# Patient Record
Sex: FEMALE | Race: WHITE | Employment: OTHER | URBAN - METROPOLITAN AREA
[De-identification: names, ages, dates, MRNs, and addresses within clinical notes are randomized per-mention and may not be internally consistent; named-entity substitution may affect disease eponyms.]

---

## 2018-02-11 ENCOUNTER — APPOINTMENT (OUTPATIENT)
Dept: GENERAL RADIOLOGY | Facility: CLINIC | Age: 83
End: 2018-02-11
Attending: EMERGENCY MEDICINE
Payer: MEDICARE

## 2018-02-11 ENCOUNTER — HOSPITAL ENCOUNTER (EMERGENCY)
Facility: CLINIC | Age: 83
Discharge: HOME | End: 2018-02-11
Attending: EMERGENCY MEDICINE | Admitting: EMERGENCY MEDICINE
Payer: MEDICARE

## 2018-02-11 VITALS
RESPIRATION RATE: 12 BRPM | BODY MASS INDEX: 19.63 KG/M2 | HEART RATE: 94 BPM | HEIGHT: 60 IN | TEMPERATURE: 97.8 F | WEIGHT: 100 LBS | OXYGEN SATURATION: 97 % | DIASTOLIC BLOOD PRESSURE: 100 MMHG | SYSTOLIC BLOOD PRESSURE: 137 MMHG

## 2018-02-11 DIAGNOSIS — S62.102A FRACTURE OF WRIST, LEFT, CLOSED, INITIAL ENCOUNTER: ICD-10-CM

## 2018-02-11 LAB
ANION GAP SERPL CALCULATED.3IONS-SCNC: 4 MMOL/L (ref 3–14)
BASOPHILS # BLD AUTO: 0 10E9/L (ref 0–0.2)
BASOPHILS NFR BLD AUTO: 0.2 %
BUN SERPL-MCNC: 18 MG/DL (ref 7–30)
CALCIUM SERPL-MCNC: 8.7 MG/DL (ref 8.5–10.1)
CHLORIDE SERPL-SCNC: 111 MMOL/L (ref 94–109)
CO2 SERPL-SCNC: 29 MMOL/L (ref 20–32)
CREAT SERPL-MCNC: 0.67 MG/DL (ref 0.52–1.04)
DIFFERENTIAL METHOD BLD: ABNORMAL
EOSINOPHIL # BLD AUTO: 0 10E9/L (ref 0–0.7)
EOSINOPHIL NFR BLD AUTO: 0.3 %
ERYTHROCYTE [DISTWIDTH] IN BLOOD BY AUTOMATED COUNT: 16.7 % (ref 10–15)
GFR SERPL CREATININE-BSD FRML MDRD: 82 ML/MIN/1.7M2
GLUCOSE SERPL-MCNC: 100 MG/DL (ref 70–99)
HCT VFR BLD AUTO: 35.4 % (ref 35–47)
HGB BLD-MCNC: 11.8 G/DL (ref 11.7–15.7)
IMM GRANULOCYTES # BLD: 0 10E9/L (ref 0–0.4)
IMM GRANULOCYTES NFR BLD: 0.3 %
INTERPRETATION ECG - MUSE: NORMAL
LYMPHOCYTES # BLD AUTO: 0.6 10E9/L (ref 0.8–5.3)
LYMPHOCYTES NFR BLD AUTO: 9.8 %
MCH RBC QN AUTO: 31.8 PG (ref 26.5–33)
MCHC RBC AUTO-ENTMCNC: 33.3 G/DL (ref 31.5–36.5)
MCV RBC AUTO: 95 FL (ref 78–100)
MONOCYTES # BLD AUTO: 0.4 10E9/L (ref 0–1.3)
MONOCYTES NFR BLD AUTO: 6 %
NEUTROPHILS # BLD AUTO: 5.4 10E9/L (ref 1.6–8.3)
NEUTROPHILS NFR BLD AUTO: 83.4 %
NRBC # BLD AUTO: 0 10*3/UL
NRBC BLD AUTO-RTO: 0 /100
PLATELET # BLD AUTO: 162 10E9/L (ref 150–450)
POTASSIUM SERPL-SCNC: 3.6 MMOL/L (ref 3.4–5.3)
RBC # BLD AUTO: 3.71 10E12/L (ref 3.8–5.2)
SODIUM SERPL-SCNC: 144 MMOL/L (ref 133–144)
WBC # BLD AUTO: 6.5 10E9/L (ref 4–11)

## 2018-02-11 PROCEDURE — 25000132 ZZH RX MED GY IP 250 OP 250 PS 637: Mod: GY | Performed by: EMERGENCY MEDICINE

## 2018-02-11 PROCEDURE — 25000128 H RX IP 250 OP 636: Performed by: EMERGENCY MEDICINE

## 2018-02-11 PROCEDURE — 93005 ELECTROCARDIOGRAM TRACING: CPT

## 2018-02-11 PROCEDURE — 80048 BASIC METABOLIC PNL TOTAL CA: CPT | Performed by: EMERGENCY MEDICINE

## 2018-02-11 PROCEDURE — A9270 NON-COVERED ITEM OR SERVICE: HCPCS | Mod: GY | Performed by: EMERGENCY MEDICINE

## 2018-02-11 PROCEDURE — 40000986 XR WRIST LT  2 VW: Mod: LT

## 2018-02-11 PROCEDURE — 99152 MOD SED SAME PHYS/QHP 5/>YRS: CPT

## 2018-02-11 PROCEDURE — 99291 CRITICAL CARE FIRST HOUR: CPT | Mod: 25

## 2018-02-11 PROCEDURE — 73090 X-RAY EXAM OF FOREARM: CPT | Mod: LT

## 2018-02-11 PROCEDURE — 85025 COMPLETE CBC W/AUTO DIFF WBC: CPT | Performed by: EMERGENCY MEDICINE

## 2018-02-11 PROCEDURE — 25605 CLTX DST RDL FX/EPHYS SEP W/: CPT | Mod: LT

## 2018-02-11 RX ORDER — PROPOFOL 10 MG/ML
60 INJECTION, EMULSION INTRAVENOUS ONCE
Status: COMPLETED | OUTPATIENT
Start: 2018-02-11 | End: 2018-02-11

## 2018-02-11 RX ORDER — ACETAMINOPHEN 325 MG/1
650 TABLET ORAL ONCE
Status: COMPLETED | OUTPATIENT
Start: 2018-02-11 | End: 2018-02-11

## 2018-02-11 RX ORDER — TRAMADOL HYDROCHLORIDE 50 MG/1
50 TABLET ORAL EVERY 4 HOURS PRN
Qty: 20 TABLET | Refills: 0 | Status: SHIPPED | OUTPATIENT
Start: 2018-02-11

## 2018-02-11 RX ORDER — PROPOFOL 10 MG/ML
INJECTION, EMULSION INTRAVENOUS
Status: DISCONTINUED
Start: 2018-02-11 | End: 2018-02-11 | Stop reason: HOSPADM

## 2018-02-11 RX ADMIN — PROPOFOL 60 MG: 10 INJECTION, EMULSION INTRAVENOUS at 14:43

## 2018-02-11 RX ADMIN — ACETAMINOPHEN 650 MG: 325 TABLET, FILM COATED ORAL at 15:39

## 2018-02-11 ASSESSMENT — ENCOUNTER SYMPTOMS
DYSURIA: 0
NECK PAIN: 0
HEADACHES: 0
BACK PAIN: 0

## 2018-02-11 NOTE — ED AVS SNAPSHOT
Emergency Department    [address] [facility] [address]    Phone:  [phone]    Fax:  [phone]                                       Elana Renae   MRN: [id number]    Department:   Emergency Department   Date of Visit:  2/11/2018           Patient Information     Date Of Birth          1/12/1927        Your diagnoses for this visit were:     Fracture of wrist, left, closed, initial encounter        You were seen by Jennie Vivas MD.      Follow-up Information     Follow up with yLnne Coon MD.    Specialty:  Orthopedics    Why:  this week for follow-up and cast placement.    Contact information:    [facility]  [address] [place] [id number] [phone]          Follow up with  Emergency Department.    Specialty:  EMERGENCY MEDICINE    Why:  As needed for problems    Contact information:    [address] [facility] [address] [phone]        Discharge Instructions       Take Tylenol 650 mg every 4 hours as needed for pain.    Ice/elevated wrist for the next 2 days.    No driving a car until you talk to the Orthopedic Surgeon and get your cast.    Opioid Medication Information    You have been given a prescription for an opioid (narcotic) pain medicine and/or have received a pain medicine while here in the Emergency Department. These medicines can make you drowsy or impaired. You must not drive, operate dangerous equipment, or engage in any other dangerous activities while taking these medications. If you drive while taking these medications, you could be arrested for DUI, or driving under the influence. Do not drink any alcohol while you are taking these medications.   Opioid pain medications can cause addiction. If you have a history of chemical dependency of any type, you are at a higher risk of becoming addicted to pain medications.  Only take these prescribed medications to treat your pain when all other options  have been tried. Take it for as short a time and as few doses as possible. Store your pain pills in a secure place, as they are frequently stolen and provide a dangerous opportunity for children or visitors in your house to start abusing these powerful medications. We will not replace any lost or stolen medicine.  As soon as your pain is better, you should flush all your remaining medication.   Many prescription pain medications contain Tylenol  (acetaminophen), including Vicodin , Tylenol #3 , Norco , Lortab , and Percocet .  You should not take any extra pills of Tylenol  if you are using these prescription medications or you can get very sick.  Do not ever take more than 4000 mg of acetaminophen in any 24 hour period.  All opioids tend to cause constipation. Drink plenty of water and eat foods that have a lot of fiber, such as fruits, vegetables, prune juice, apple juice and high fiber cereal.  Take a laxative if you don t move your bowels at least every other day. Miralax , Milk of Magnesia, Colace , or Senna  can be used to keep you regular.            Discharge References/Attachments     WRIST FRACTURES, TREATING (ENGLISH)      24 Hour Appointment Hotline       To make an appointment at any [facility] clinic, call [phone] ([phone]). If you don't have a family doctor or clinic, we will help you find one. [facility] clinics are conveniently located to serve the needs of you and your family.             Review of your medicines      START taking        Dose / Directions Last dose taken    traMADol 50 MG tablet   Commonly known as:  ULTRAM   Dose:  50 mg   Quantity:  20 tablet   Replaces:  traMADol 100 MG 24 hr tablet        Take 1 tablet (50 mg) by mouth every 4 hours as needed for moderate pain No driving a car or drinking alcohol for 8 hours after taking this medication.   Refills:  0          Our records show that you are taking the medicines listed below. If these are incorrect, please call your  family doctor or clinic.        Dose / Directions Last dose taken    acetaminophen 500 MG tablet   Commonly known as:  TYLENOL   Dose:  1000 mg        Take 2 tablets (1,000 mg) by mouth 3 times daily   Refills:  0        ACIDOPHILUS PO   Dose:  1 tablet        Take 1 tablet by mouth 3 times daily   Refills:  0        AMOXICILLIN PO   Dose:  1000 mg        Take 1,000 mg by mouth 2 times daily X 14 days for H. Pylori   Refills:  0        ASPIRIN PO   Dose:  325 mg        Take 325 mg by mouth daily   Refills:  0        CLARITHROMYCIN PO   Dose:  500 mg        Take 500 mg by mouth 2 times daily X 14 days for H. Pylori   Refills:  0        diltiazem 180 MG 24 hr ER beaded capsule   Commonly known as:  TIAZAC   Dose:  180 mg        Take 180 mg by mouth daily   Refills:  0        metoprolol tartrate 50 MG tablet   Commonly known as:  LOPRESSOR   Dose:  150 mg   Quantity:  90 tablet        Take 3 tablets (150 mg) by mouth 2 times daily   Refills:  0        MULTIVITAMIN PO   Dose:  1 tablet        Take 1 tablet by mouth daily   Refills:  0        OMEGA-3 FISH OIL PO   Dose:  1 g        Take 1 g by mouth 3 times daily (with meals)   Refills:  0        OMEPRAZOLE PO   Dose:  20 mg        Take 20 mg by mouth 2 times daily (before meals)   Refills:  0        VITAMIN D (CHOLECALCIFEROL) PO   Dose:  800 Units        Take 800 Units by mouth daily   Refills:  0          STOP taking        Dose Reason for stopping Comments    traMADol 100 MG 24 hr tablet   Commonly known as:  ULTRAM-ER   Replaced by:  traMADol 50 MG tablet                      Prescriptions were sent or printed at these locations (1 Prescription)                   Other Prescriptions                Printed at Department/Unit printer (1 of 1)         traMADol (ULTRAM) 50 MG tablet                Procedures and tests performed during your visit     Basic metabolic panel    CBC with platelets + differential    EKG 12 lead    XR Forearm Left 2 Views    XR Wrist Left 2  Views      Orders Needing Specimen Collection     None      Pending Results     No orders found from 2/9/2018 to 2/12/2018.            Pending Culture Results     No orders found from 2/9/2018 to 2/12/2018.            Pending Results Instructions     If you had any lab results that were not finalized at the time of your Discharge, you can call the ED Lab Result RN at [phone]. You will be contacted by this team for any positive Lab results or changes in treatment. The nurses are available 7 days a week from 10A to 6:30P.  You can leave a message 24 hours per day and they will return your call.        Test Results From Your Hospital Stay        2/11/2018  1:32 PM      Component Results     Component Value Ref Range & Units Status    WBC 6.5 4.0 - 11.0 10e9/L Final    RBC Count 3.71 (L) 3.8 - 5.2 10e12/L Final    Hemoglobin 11.8 11.7 - 15.7 g/dL Final    Hematocrit 35.4 35.0 - 47.0 % Final    MCV 95 78 - 100 fl Final    MCH 31.8 26.5 - 33.0 pg Final    MCHC 33.3 31.5 - 36.5 g/dL Final    RDW 16.7 (H) 10.0 - 15.0 % Final    Platelet Count 162 150 - 450 10e9/L Final    Diff Method Automated Method  Final    % Neutrophils 83.4 % Final    % Lymphocytes 9.8 % Final    % Monocytes 6.0 % Final    % Eosinophils 0.3 % Final    % Basophils 0.2 % Final    % Immature Granulocytes 0.3 % Final    Nucleated RBCs 0 0 /100 Final    Absolute Neutrophil 5.4 1.6 - 8.3 10e9/L Final    Absolute Lymphocytes 0.6 (L) 0.8 - 5.3 10e9/L Final    Absolute Monocytes 0.4 0.0 - 1.3 10e9/L Final    Absolute Eosinophils 0.0 0.0 - 0.7 10e9/L Final    Absolute Basophils 0.0 0.0 - 0.2 10e9/L Final    Abs Immature Granulocytes 0.0 0 - 0.4 10e9/L Final    Absolute Nucleated RBC 0.0  Final         2/11/2018  1:54 PM      Component Results     Component Value Ref Range & Units Status    Sodium 144 133 - 144 mmol/L Final    Potassium 3.6 3.4 - 5.3 mmol/L Final    Chloride 111 (H) 94 - 109 mmol/L Final    Carbon Dioxide 29 20 - 32 mmol/L Final    Anion Gap  4 3 - 14 mmol/L Final    Glucose 100 (H) 70 - 99 mg/dL Final    Urea Nitrogen 18 7 - 30 mg/dL Final    Creatinine 0.67 0.52 - 1.04 mg/dL Final    GFR Estimate 82 >60 mL/min/1.7m2 Final    Non  GFR Calc    GFR Estimate If Black >90 >60 mL/min/1.7m2 Final    African American GFR Calc    Calcium 8.7 8.5 - 10.1 mg/dL Final                     2/11/2018  1:32 PM      Narrative     LEFT FOREARM TWO VIEWS 2/11/2018 1:10 PM     HISTORY: Deformity wrist after fall, check for fracture.     COMPARISON: None.        Impression     IMPRESSION: Posteriorly displaced fractures of the distal radius and  ulna. No other fracture demonstrated.    JAMILAH GAGNON MD         2/11/2018  3:33 PM      Narrative     WRIST LEFT TWO VIEWS 2/11/2018 3:16 PM     HISTORY: Post-reduction.     COMPARISON: Earlier same day.    FINDINGS: Overlying splint obscures detail. The fracture  demonstrates  improved alignment.        Impression     IMPRESSION: Wrist fracture in improved alignment.     JAMILAH GAGNON MD                Clinical Quality Measure: Blood Pressure Screening     Your blood pressure was checked while you were in the emergency department today. The last reading we obtained was  BP: (!) 137/100 . Please read the guidelines below about what these numbers mean and what you should do about them.  If your systolic blood pressure (the top number) is less than 120 and your diastolic blood pressure (the bottom number) is less than 80, then your blood pressure is normal. There is nothing more that you need to do about it.  If your systolic blood pressure (the top number) is 120-139 or your diastolic blood pressure (the bottom number) is 80-89, your blood pressure may be higher than it should be. You should have your blood pressure rechecked within a year by a primary care provider.  If your systolic blood pressure (the top number) is 140 or greater or your diastolic blood pressure (the bottom number) is 90 or greater, you may have  "high blood pressure. High blood pressure is treatable, but if left untreated over time it can put you at risk for heart attack, stroke, or kidney failure. You should have your blood pressure rechecked by a primary care provider within the next 4 weeks.  If your provider in the emergency department today gave you specific instructions to follow-up with your doctor or provider even sooner than that, you should follow that instruction and not wait for up to 4 weeks for your follow-up visit.        Thank you for choosing [facility]       Thank you for choosing [facility] for your care. Our goal is always to provide you with excellent care. Hearing back from our patients is one way we can continue to improve our services. Please take a few minutes to complete the written survey that you may receive in the mail after you visit with us. Thank you!        [facility] Information     Info lets you send messages to your doctor, view your test results, renew your prescriptions, schedule appointments and more. To sign up, go to [email or web address] . Click on \"Log in\" on the left side of the screen, which will take you to the Welcome page. Then click on \"Sign up Now\" on the right side of the page.     You will be asked to enter the access code listed below, as well as some personal information. Please follow the directions to create your username and password.     Your access code is: [email or web address]  Expires: 2018  4:20 PM     Your access code will  in 90 days. If you need help or a new code, please call your [facility] clinic or [phone].        Care EveryWhere ID     This is your Care EveryWhere ID. This could be used by other organizations to access your [facility] medical records  [id number]        Equal Access to Services     AVIVA ROTH : emperatriz Chilel qaybta kaalmada adeegyada, waxay idiin hayaan adeeg kharash la'aan ah. So [facility] [phone].    ATENCIÓN: Si leonorala espmarvin bruce " a chopra disposición servicios gratuitos de asistencia lingüística. Llcally al [phone].    We comply with applicable federal civil rights laws and [place] laws. We do not discriminate on the basis of race, color, national origin, age, disability, sex, sexual orientation, or gender identity.            After Visit Summary       This is your record. Keep this with you and show to your community pharmacist(s) and doctor(s) at your next visit.

## 2018-02-11 NOTE — ED PROVIDER NOTES
History     Chief Complaint:  Fall    HPI   Elana Renae is a 89 year old female who presents to the emergency department for evaluation of a fall. The patient states that she was walking through her hallway and turned around and lost balance and fell. She notes that she did not hit her head or lose consciousness. She tried to catch herself with he left hand and hurt her wrist with a notable deformity. She also mentions pain in her elbow. She denies any headache, neck pain, or back pain. She is not on any blood thinners. She lives in a senior apartment. She denies any dysuria chest pain or abdominal pain.     Allergies:  Amiloride  Barbiturates  Meperidin    Medications:    tramadol (ULTRAM-ER) 100 MG ER - tablet  acetaminophen (TYLENOL) 500 MG tablet  metoprolol (LOPRESSOR) 50 MG tablet  diltiazem (TIAZAC) 180 MG 24 hr ER beaded capsule  Lactobacillus (ACIDOPHILUS PO)  AMOXICILLIN PO  CLARITHROMYCIN PO  OMEPRAZOLE PO  ASPIRIN PO  Omega-3 Fatty Acids (OMEGA-3 FISH OIL PO)  VITAMIN D, CHOLECALCIFEROL, PO  Multiple Vitamins-Minerals (MULTIVITAMIN OR)    Past Medical History:    Atrial fibrillation  Cancer  CVA  Kidney stones    Past Surgical History:    C section  GI surgery  Lithotripsy  Wrist surgery    Family History:    History reviewed. No pertinent family history.     Social History:  Marital Status:   [5]  Social History   Substance Use Topics     Smoking status: Never Smoker     Alcohol use No        Review of Systems   Genitourinary: Negative for dysuria.   Musculoskeletal: Negative for back pain and neck pain.   Neurological: Negative for syncope and headaches.   All other systems reviewed and are negative.        Physical Exam     Patient Vitals for the past 24 hrs:   BP Temp Temp src Pulse Heart Rate Resp SpO2 Height Weight   02/11/18 1516 - - - - - - 97 % - -   02/11/18 1511 - - - - - - 96 % - -   02/11/18 1445 (!) 137/100 - - - 96 12 100 % - -   02/11/18 1440 (!) 148/99 - - - 95 20 100 % -  -   02/11/18 1435 (!) 164/102 - - - 96 12 100 % - -   02/11/18 1433 - - - - - 19 95 % - -   02/11/18 1429 (!) 167/116 - - - 101 9 100 % - -   02/11/18 1349 - - - - - - 96 % - -   02/11/18 1348 - - - - - - 96 % - -   02/11/18 1324 - - - - - - 95 % - -   02/11/18 1323 - - - - - - 95 % - -   02/11/18 1312 - - - - - - 95 % - -   02/11/18 1311 - - - - - - 94 % - -   02/11/18 1247 - - - - 91 12 - - -   02/11/18 1230 (!) 151/95 - - - 96 10 - - -   02/11/18 1219 - - - - 92 11 97 % - -   02/11/18 1218 - - - - 92 16 96 % - -   02/11/18 1157 (!) 170/112 97.8  F (36.6  C) Oral 94 - 16 97 % 1.524 m (5') 45.4 kg (100 lb)           Physical Exam  Nursing note and vitals reviewed.  Constitutional:  Oriented to person, place, and time.      Appears well-developed and well-nourished.   HENT:   Head:    Atraumatic.   Mouth/Throat:   Oropharynx is clear and moist. No oropharyngeal exudate.   Eyes:    EOM are normal. Pupils are equal, round, and reactive to light.   Neck:    Normal range of motion without pain. Neck supple. Non tender midline     No tracheal deviation present. No thyromegaly present.   Cardiovascular:  Normal rate, regular rhythm, normal heart sounds and      intact distal pulses.  Exam reveals no gallop and no friction rub.       No murmur heard.  Pulmonary/Chest: Effort normal and breath sounds normal.      No respiratory distress. No wheezes. No rales.      Exhibits no tenderness.   Abdominal:   Soft. Bowel sounds are normal. Exhibits no distension and      no mass. There is no tenderness.      There is no rebound and no guarding.   Musculoskeletal:  Left arm exam: deformity with swelling and ecchymosis to the distal radius and ulna. Mild tenderness to the olecranon of the elbow without deformity. Shoulder and hand are non tender. No breaks to the skin.  Good radial pulse and distal sensation.  Lymphadenopathy:  No cervical adenopathy.   Neurological:   Alert and oriented to person, place, and time. GCS 15.  CN 2-12  intact.  and proximal upper extremity strength strong and equal.  Bilateral lower extremity strength strong and equal, including strong dorsiflexion and plantarflexion strength.  Sensation intact and equal to the face, arms and legs.  No facial droop or weakness. Normal speech.  Follows commands and answers questions normally.    Skin:    Skin is warm and dry. No rash noted. No pallor.       Emergency Department Course     ECG:  ECG taken at 1206, ECG read at 1208  Atrial fibrillation  Abnormal ECG  Rate 97 bpm. MO interval * ms. QRS duration 74 ms. QT/QTc 356/452 ms. P-R-T axes * 2 -21.    Imaging:  Radiology findings were communicated with the patient who voiced understanding of the findings.    XR Forearm Left 2 Views  Final Result  IMPRESSION: Posteriorly displaced fractures of the distal radius and  ulna. No other fracture demonstrated.    JAMILAH GAGNON MD  Reading per radiology    Laboratory:  Laboratory findings were communicated with the patient who voiced understanding of the findings.    CBC: WBC 6.5, HGB 11.8,   BMP: Cl 111 (H), glucose 100 (H), o/w WNL (Creatinine 0.67)    Procedures:      Sedation:      PERFORMED BY: Jennie Vivas MD,    Pre-Procedure Assessment done at 1200.    EXPECTED LEVEL:  Minimal Sedation    INDICATION:  Sedation is required to allow for fracture reduction    Consent obtained from patient after discussing the risks, benefits and alternatives.    PO INTAKE: Appropriately NPO for procedure    ASA CLASS: Class 1 - HEALTHY PATIENT    MALLAMPATI: Grade 2:  Soft palate, base of uvula, tonsillar pillars, and portion of posterior pharyngeal wall visible    LUNGS: Lungs Clear with good breath sounds bilaterally.     HEART: Normal heart sounds and rate    History and physical reviewed and no updates needed. I have reviewed the lab findings, diagnostic data, medications, and the plan for sedation. I have determined this patient to be an appropriate candidate for the  planned sedation and procedure and have reassessed the patient IMMEDIATELY PRIOR to sedation and procedure.    Sedation Post Procedure Summary:    Prior to the start of the procedure and with procedural staff participation, I verbally confirmed the patient s identity using two indicators, relevant allergies, that the procedure was appropriate and matched the consent or emergent situation, and that the correct equipment/implants were available. Immediately prior to starting the procedure I conducted the Time Out with the procedural staff and re-confirmed the patient s name, procedure, and site/side. (The Joint Commission universal protocol was followed.)  Yes      SEDATIVES: Propofol    Vital signs, airway, End Tidal CO2 and pulse oximetry were monitored and remained stable throughout the procedure and sedation was maintained until the procedure was complete.  The patient was monitored by staff until sedation discharge criteria were met.    PATIENT TOLERANCE: Patient tolerated the procedure well with no immediate complications.    TIME OF SEDATION IN MINUTES:  15 minutes from beginning to end of physician one to one monitoring.      Procedure Note: Reduction of Fractured Wrist  Physician: Jennie Vivas MD,  Diagnosis: distal radius and ulna reduction, left  Consent: Informed written, see paper chart  Description of Procedure: Consent as above.  Patient positioned in supine position.  Procedural anesthesia was utilized, see above note.  The arm was grasped above and below fracture.  Recreating mechanism of injury the fracture area was reduced successfully utilizing traction and manipulation motions.   The neurovascular exam was normal before and after reduction attempt.  The patient tolerated the procedure well, there were no complications.      Procedure Note: Splint Placement  Physician: Jennie Vivas MD,  Diagnosis: distal radius and ulna reduction, left  Consent: Informed written, see paper  chart  Description of Procedure:  Following reduction of wrist fracture a short arm sugartong splint was applied (orthoglass) to the MCP's.  The elbow was maintained at 90 degrees flexion.  The neurovascular exam was normal before and after splint placement.  The patient tolerated the procedure well, there were no complications. A sling was applied.     Procedure Note: Hematoma block:  PROCEDURE: Hematoma block right distal radius fracture    INDICATION:  Fracture distal radius    PHYSICIAN:  Jennie Vivas MD,  DESCRIPTION OF PROCEDURE:    Informed consent. Site was correctly identified. Anesthesia was obtained with 1 cc of 1% lidocaine without epinephrine and 1 CC of Marcaine, local infiltration.  Using a 18 gauge needle in standard fashion the distal radius fracture site was entered and 8cc of lidocaine introduced.   Band-Aid over site after.  Patient tolerated the procedure well, no complications.      Interventions:  1443 Propofol 60 mg IV    Emergency Department Course:  Nursing notes and vitals reviewed.  I performed an exam of the patient as documented above.   I discussed the treatment plan with the patient. They expressed understanding of this plan and consented to discharge. They will be discharged home with instructions for care and follow up. In addition, the patient will return to the emergency department if their symptoms persist, worsen, if new symptoms arise or if there is any concern.  All questions were answered.    I personally reviewed the imaging and lab results with the patient and answered all related questions prior to discharge.  Impression & Plan      Medical Decision Making:  I found the patient to have a closed left distal radius and ulnar fractures which I reduced under IV procedural sedation with good reduction and splint was placed. She does not have any other injuries and states she does not think she hit her head and she has no headache or neck pain. I discussed with the  patient admitting her for observation vs if she cannot manage at home but she strongly wishes to be discharged home so she was discharged with her son. She was told not to drive her car until further discussion with orthopedic surgery and to follow up this week. Her son states that he will give her assistance at home and I ambulated her while here.     Diagnosis:    ICD-10-CM    1. Fracture of wrist, left, closed, initial encounter S62.102A      Disposition:   Discharged    Discharge Medications:  New Prescriptions    TRAMADOL (ULTRAM) 50 MG TABLET    Take 1 tablet (50 mg) by mouth every 4 hours as needed for moderate pain No driving a car or drinking alcohol for 8 hours after taking this medication.       Scribe Disclosure:  I, Asaf Crane, am serving as a scribe at 12:10 PM on 2/11/2018 to document services personally performed by Jennie Vivas MD, based on my observations and the provider's statements to me.        EMERGENCY DEPARTMENT       Jennie Vivas MD  02/11/18 1077

## 2018-02-11 NOTE — DISCHARGE INSTRUCTIONS
Take Tylenol 650 mg every 4 hours as needed for pain.    Ice/elevated wrist for the next 2 days.    No driving a car until you talk to the Orthopedic Surgeon and get your cast.    Opioid Medication Information    You have been given a prescription for an opioid (narcotic) pain medicine and/or have received a pain medicine while here in the Emergency Department. These medicines can make you drowsy or impaired. You must not drive, operate dangerous equipment, or engage in any other dangerous activities while taking these medications. If you drive while taking these medications, you could be arrested for DUI, or driving under the influence. Do not drink any alcohol while you are taking these medications.   Opioid pain medications can cause addiction. If you have a history of chemical dependency of any type, you are at a higher risk of becoming addicted to pain medications.  Only take these prescribed medications to treat your pain when all other options have been tried. Take it for as short a time and as few doses as possible. Store your pain pills in a secure place, as they are frequently stolen and provide a dangerous opportunity for children or visitors in your house to start abusing these powerful medications. We will not replace any lost or stolen medicine.  As soon as your pain is better, you should flush all your remaining medication.   Many prescription pain medications contain Tylenol  (acetaminophen), including Vicodin , Tylenol #3 , Norco , Lortab , and Percocet .  You should not take any extra pills of Tylenol  if you are using these prescription medications or you can get very sick.  Do not ever take more than 4000 mg of acetaminophen in any 24 hour period.  All opioids tend to cause constipation. Drink plenty of water and eat foods that have a lot of fiber, such as fruits, vegetables, prune juice, apple juice and high fiber cereal.  Take a laxative if you don t move your bowels at least every other day.  Miralax , Milk of Magnesia, Colace , or Senna  can be used to keep you regular.

## 2018-02-11 NOTE — ED NOTES
Bed: ED11  Expected date: 2/11/18  Expected time: 11:39 AM  Means of arrival: Ambulance  Comments:  512 89F wrist fx  ETA 1142

## 2018-02-11 NOTE — ED AVS SNAPSHOT
Emergency Department    [address] [place] [id number]    Phone:  [phone]    Fax:  [phone]                                       Elana Renae   MRN: [id number]    Department:   Emergency Department   Date of Visit:  2/11/2018           After Visit Summary Signature Page     I have received my discharge instructions, and my questions have been answered. I have discussed any challenges I see with this plan with the nurse or doctor.    ..........................................................................................................................................  Patient/Patient Representative Signature      ..........................................................................................................................................  Patient Representative Print Name and Relationship to Patient    ..................................................               ................................................  Date                                            Time    ..........................................................................................................................................  Reviewed by Signature/Title    ...................................................              ..............................................  Date                                                            Time

## 2018-02-11 NOTE — ED NOTES
Bed: ED11  Expected date: 2/11/18  Expected time: 2:26 PM  Means of arrival:   Comments:  Pt in STAB3

## 2018-02-26 ENCOUNTER — ANESTHESIA (OUTPATIENT)
Dept: SURGERY | Facility: CLINIC | Age: 83
End: 2018-02-26
Payer: MEDICARE

## 2018-02-26 ENCOUNTER — SURGERY (OUTPATIENT)
Age: 83
End: 2018-02-26

## 2018-02-26 ENCOUNTER — APPOINTMENT (OUTPATIENT)
Dept: GENERAL RADIOLOGY | Facility: CLINIC | Age: 83
End: 2018-02-26
Attending: ORTHOPAEDIC SURGERY
Payer: MEDICARE

## 2018-02-26 ENCOUNTER — ANESTHESIA EVENT (OUTPATIENT)
Dept: SURGERY | Facility: CLINIC | Age: 83
End: 2018-02-26
Payer: MEDICARE

## 2018-02-26 ENCOUNTER — HOSPITAL ENCOUNTER (OUTPATIENT)
Facility: CLINIC | Age: 83
Discharge: HOME | End: 2018-02-26
Attending: ORTHOPAEDIC SURGERY | Admitting: ORTHOPAEDIC SURGERY
Payer: MEDICARE

## 2018-02-26 VITALS
RESPIRATION RATE: 12 BRPM | OXYGEN SATURATION: 98 % | TEMPERATURE: 98.2 F | BODY MASS INDEX: 20.62 KG/M2 | DIASTOLIC BLOOD PRESSURE: 76 MMHG | HEIGHT: 60 IN | SYSTOLIC BLOOD PRESSURE: 120 MMHG | WEIGHT: 105 LBS

## 2018-02-26 DIAGNOSIS — S52.602A CLOSED FRACTURE OF DISTAL ENDS OF LEFT RADIUS AND ULNA, INITIAL ENCOUNTER: Primary | ICD-10-CM

## 2018-02-26 DIAGNOSIS — S52.502A CLOSED FRACTURE OF DISTAL ENDS OF LEFT RADIUS AND ULNA, INITIAL ENCOUNTER: Primary | ICD-10-CM

## 2018-02-26 PROCEDURE — 25000128 H RX IP 250 OP 636: Performed by: ANESTHESIOLOGY

## 2018-02-26 PROCEDURE — C1713 ANCHOR/SCREW BN/BN,TIS/BN: HCPCS | Performed by: ORTHOPAEDIC SURGERY

## 2018-02-26 PROCEDURE — 27211024 ZZHC OR SUPPLY OTHER OPNP: Performed by: ORTHOPAEDIC SURGERY

## 2018-02-26 PROCEDURE — 40000170 ZZH STATISTIC PRE-PROCEDURE ASSESSMENT II: Performed by: ORTHOPAEDIC SURGERY

## 2018-02-26 PROCEDURE — 27210794 ZZH OR GENERAL SUPPLY STERILE: Performed by: ORTHOPAEDIC SURGERY

## 2018-02-26 PROCEDURE — 71000027 ZZH RECOVERY PHASE 2 EACH 15 MINS: Performed by: ORTHOPAEDIC SURGERY

## 2018-02-26 PROCEDURE — 37000008 ZZH ANESTHESIA TECHNICAL FEE, 1ST 30 MIN: Performed by: ORTHOPAEDIC SURGERY

## 2018-02-26 PROCEDURE — 25000128 H RX IP 250 OP 636: Performed by: NURSE ANESTHETIST, CERTIFIED REGISTERED

## 2018-02-26 PROCEDURE — 25000128 H RX IP 250 OP 636: Performed by: PHYSICIAN ASSISTANT

## 2018-02-26 PROCEDURE — 71000012 ZZH RECOVERY PHASE 1 LEVEL 1 FIRST HR: Performed by: ORTHOPAEDIC SURGERY

## 2018-02-26 PROCEDURE — 36000058 ZZH SURGERY LEVEL 3 EA 15 ADDTL MIN: Performed by: ORTHOPAEDIC SURGERY

## 2018-02-26 PROCEDURE — 37000009 ZZH ANESTHESIA TECHNICAL FEE, EACH ADDTL 15 MIN: Performed by: ORTHOPAEDIC SURGERY

## 2018-02-26 PROCEDURE — 71000013 ZZH RECOVERY PHASE 1 LEVEL 1 EA ADDTL HR: Performed by: ORTHOPAEDIC SURGERY

## 2018-02-26 PROCEDURE — 40000277 XR SURGERY CARM FLUORO LESS THAN 5 MIN W STILLS

## 2018-02-26 PROCEDURE — 25000125 ZZHC RX 250: Performed by: NURSE ANESTHETIST, CERTIFIED REGISTERED

## 2018-02-26 PROCEDURE — 25000125 ZZHC RX 250: Performed by: ORTHOPAEDIC SURGERY

## 2018-02-26 PROCEDURE — 27210995 ZZH RX 272: Performed by: ORTHOPAEDIC SURGERY

## 2018-02-26 PROCEDURE — 36000060 ZZH SURGERY LEVEL 3 W FLUORO 1ST 30 MIN: Performed by: ORTHOPAEDIC SURGERY

## 2018-02-26 DEVICE — IMP WIRE KIRSCHNER 0.045X4" 78.2020: Type: IMPLANTABLE DEVICE | Site: WRIST | Status: FUNCTIONAL

## 2018-02-26 RX ORDER — NALOXONE HYDROCHLORIDE 0.4 MG/ML
.1-.4 INJECTION, SOLUTION INTRAMUSCULAR; INTRAVENOUS; SUBCUTANEOUS
Status: DISCONTINUED | OUTPATIENT
Start: 2018-02-26 | End: 2018-02-26 | Stop reason: HOSPADM

## 2018-02-26 RX ORDER — ONDANSETRON 4 MG/1
4 TABLET, ORALLY DISINTEGRATING ORAL EVERY 30 MIN PRN
Status: DISCONTINUED | OUTPATIENT
Start: 2018-02-26 | End: 2018-02-26 | Stop reason: HOSPADM

## 2018-02-26 RX ORDER — HYDRALAZINE HYDROCHLORIDE 20 MG/ML
2.5-5 INJECTION INTRAMUSCULAR; INTRAVENOUS EVERY 10 MIN PRN
Status: DISCONTINUED | OUTPATIENT
Start: 2018-02-26 | End: 2018-02-26 | Stop reason: HOSPADM

## 2018-02-26 RX ORDER — CEFAZOLIN SODIUM 1 G/3ML
INJECTION, POWDER, FOR SOLUTION INTRAMUSCULAR; INTRAVENOUS PRN
Status: DISCONTINUED | OUTPATIENT
Start: 2018-02-26 | End: 2018-02-26

## 2018-02-26 RX ORDER — FENTANYL CITRATE 50 UG/ML
25-50 INJECTION, SOLUTION INTRAMUSCULAR; INTRAVENOUS
Status: COMPLETED | OUTPATIENT
Start: 2018-02-26 | End: 2018-02-26

## 2018-02-26 RX ORDER — ONDANSETRON 2 MG/ML
INJECTION INTRAMUSCULAR; INTRAVENOUS PRN
Status: DISCONTINUED | OUTPATIENT
Start: 2018-02-26 | End: 2018-02-26

## 2018-02-26 RX ORDER — PROPOFOL 10 MG/ML
INJECTION, EMULSION INTRAVENOUS CONTINUOUS PRN
Status: DISCONTINUED | OUTPATIENT
Start: 2018-02-26 | End: 2018-02-26

## 2018-02-26 RX ORDER — CEFAZOLIN SODIUM 2 G/100ML
2 INJECTION, SOLUTION INTRAVENOUS
Status: COMPLETED | OUTPATIENT
Start: 2018-02-26 | End: 2018-02-26

## 2018-02-26 RX ORDER — SODIUM CHLORIDE, SODIUM LACTATE, POTASSIUM CHLORIDE, CALCIUM CHLORIDE 600; 310; 30; 20 MG/100ML; MG/100ML; MG/100ML; MG/100ML
INJECTION, SOLUTION INTRAVENOUS CONTINUOUS
Status: DISCONTINUED | OUTPATIENT
Start: 2018-02-26 | End: 2018-02-26 | Stop reason: HOSPADM

## 2018-02-26 RX ORDER — MEPERIDINE HYDROCHLORIDE 25 MG/ML
12.5 INJECTION INTRAMUSCULAR; INTRAVENOUS; SUBCUTANEOUS
Status: DISCONTINUED | OUTPATIENT
Start: 2018-02-26 | End: 2018-02-26 | Stop reason: HOSPADM

## 2018-02-26 RX ORDER — CEFAZOLIN SODIUM 1 G
1 VIAL (EA) INJECTION SEE ADMIN INSTRUCTIONS
Status: DISCONTINUED | OUTPATIENT
Start: 2018-02-26 | End: 2018-02-26 | Stop reason: HOSPADM

## 2018-02-26 RX ORDER — BUPIVACAINE HYDROCHLORIDE AND EPINEPHRINE 5; 5 MG/ML; UG/ML
INJECTION, SOLUTION PERINEURAL PRN
Status: DISCONTINUED | OUTPATIENT
Start: 2018-02-26 | End: 2018-02-26 | Stop reason: HOSPADM

## 2018-02-26 RX ORDER — HYDROMORPHONE HYDROCHLORIDE 1 MG/ML
.3-.5 INJECTION, SOLUTION INTRAMUSCULAR; INTRAVENOUS; SUBCUTANEOUS EVERY 10 MIN PRN
Status: DISCONTINUED | OUTPATIENT
Start: 2018-02-26 | End: 2018-02-26 | Stop reason: HOSPADM

## 2018-02-26 RX ORDER — MAGNESIUM HYDROXIDE 1200 MG/15ML
LIQUID ORAL PRN
Status: DISCONTINUED | OUTPATIENT
Start: 2018-02-26 | End: 2018-02-26 | Stop reason: HOSPADM

## 2018-02-26 RX ORDER — FENTANYL CITRATE 50 UG/ML
INJECTION, SOLUTION INTRAMUSCULAR; INTRAVENOUS PRN
Status: DISCONTINUED | OUTPATIENT
Start: 2018-02-26 | End: 2018-02-26

## 2018-02-26 RX ORDER — ONDANSETRON 2 MG/ML
4 INJECTION INTRAMUSCULAR; INTRAVENOUS EVERY 30 MIN PRN
Status: DISCONTINUED | OUTPATIENT
Start: 2018-02-26 | End: 2018-02-26 | Stop reason: HOSPADM

## 2018-02-26 RX ORDER — SODIUM CHLORIDE, SODIUM LACTATE, POTASSIUM CHLORIDE, CALCIUM CHLORIDE 600; 310; 30; 20 MG/100ML; MG/100ML; MG/100ML; MG/100ML
INJECTION, SOLUTION INTRAVENOUS CONTINUOUS PRN
Status: DISCONTINUED | OUTPATIENT
Start: 2018-02-26 | End: 2018-02-26

## 2018-02-26 RX ORDER — ALBUTEROL SULFATE 0.83 MG/ML
2.5 SOLUTION RESPIRATORY (INHALATION) EVERY 4 HOURS PRN
Status: DISCONTINUED | OUTPATIENT
Start: 2018-02-26 | End: 2018-02-26 | Stop reason: HOSPADM

## 2018-02-26 RX ORDER — LABETALOL HYDROCHLORIDE 5 MG/ML
10 INJECTION, SOLUTION INTRAVENOUS
Status: DISCONTINUED | OUTPATIENT
Start: 2018-02-26 | End: 2018-02-26 | Stop reason: HOSPADM

## 2018-02-26 RX ORDER — FENTANYL CITRATE 50 UG/ML
25-50 INJECTION, SOLUTION INTRAMUSCULAR; INTRAVENOUS
Status: DISCONTINUED | OUTPATIENT
Start: 2018-02-26 | End: 2018-02-26 | Stop reason: HOSPADM

## 2018-02-26 RX ADMIN — ONDANSETRON 4 MG: 2 INJECTION INTRAMUSCULAR; INTRAVENOUS at 14:18

## 2018-02-26 RX ADMIN — PHENYLEPHRINE HYDROCHLORIDE 100 MCG: 10 INJECTION INTRAVENOUS at 13:51

## 2018-02-26 RX ADMIN — SODIUM CHLORIDE, POTASSIUM CHLORIDE, SODIUM LACTATE AND CALCIUM CHLORIDE: 600; 310; 30; 20 INJECTION, SOLUTION INTRAVENOUS at 12:55

## 2018-02-26 RX ADMIN — CEFAZOLIN SODIUM 2 G: 2 INJECTION, SOLUTION INTRAVENOUS at 13:24

## 2018-02-26 RX ADMIN — FENTANYL CITRATE 25 MCG: 50 INJECTION, SOLUTION INTRAMUSCULAR; INTRAVENOUS at 12:50

## 2018-02-26 RX ADMIN — PHENYLEPHRINE HYDROCHLORIDE 100 MCG: 10 INJECTION INTRAVENOUS at 14:36

## 2018-02-26 RX ADMIN — BUPIVACAINE HYDROCHLORIDE AND EPINEPHRINE BITARTRATE 8 ML: 5; .005 INJECTION, SOLUTION PERINEURAL at 14:38

## 2018-02-26 RX ADMIN — PROPOFOL 50 MCG/KG/MIN: 10 INJECTION, EMULSION INTRAVENOUS at 13:23

## 2018-02-26 RX ADMIN — DEXMEDETOMIDINE HYDROCHLORIDE 20 MCG: 100 INJECTION, SOLUTION INTRAVENOUS at 13:17

## 2018-02-26 RX ADMIN — SODIUM CHLORIDE 1000 ML: 900 IRRIGANT IRRIGATION at 13:42

## 2018-02-26 RX ADMIN — FENTANYL CITRATE 25 MCG: 50 INJECTION, SOLUTION INTRAMUSCULAR; INTRAVENOUS at 13:39

## 2018-02-26 RX ADMIN — FENTANYL CITRATE 25 MCG: 50 INJECTION, SOLUTION INTRAMUSCULAR; INTRAVENOUS at 14:03

## 2018-02-26 ASSESSMENT — COPD QUESTIONNAIRES: COPD: 0

## 2018-02-26 ASSESSMENT — LIFESTYLE VARIABLES: TOBACCO_USE: 0

## 2018-02-26 ASSESSMENT — ENCOUNTER SYMPTOMS
SEIZURES: 0
DYSRHYTHMIAS: 0

## 2018-02-26 NOTE — ANESTHESIA CARE TRANSFER NOTE
Patient: Elana Renae    Procedure(s):  CORRECTIVE OSTEOTOMY AND OPEN REDUCTION INTERNAL FIXATION OF LEFT DISTAL RADIUS FRACTURE  - Wound Class: I-Clean    Diagnosis: LEFT DISTAL RADIUS FRACTURE  Diagnosis Additional Information: No value filed.    Anesthesia Type:   Peripheral Nerve Block     Note:  Airway :Face Mask  Patient transferred to:PACU  Handoff Report: Identifed the Patient, Identified the Reponsible Provider, Reviewed the pertinent medical history, Discussed the surgical course, Reviewed Intra-OP anesthesia mangement and issues during anesthesia, Set expectations for post-procedure period and Allowed opportunity for questions and acknowledgement of understanding      Vitals: (Last set prior to Anesthesia Care Transfer)    CRNA VITALS  2/26/2018 1429 - 2/26/2018 1503      2/26/2018             Resp Rate (set): 10                Electronically Signed By: ARMANDO Hardwick CRNA  February 26, 2018  3:03 PM

## 2018-02-26 NOTE — BRIEF OP NOTE
[facility] Brief Operative Note    Pre-operative diagnosis: LEFT DISTAL RADIUS FRACTURE/ MALUNION   Post-operative diagnosis same   Procedure: Procedure(s):  CORRECTIVE OSTEOTOMY AND OPEN REDUCTION INTERNAL FIXATION OF LEFT DISTAL RADIUS FRACTURE  - Wound Class: I-Clean   Surgeon(s): Surgeon(s) and Role:     * Lynne Coon MD - Primary     * Bettina Coyne PA-C - Assisting   Estimated blood loss: 10 mL    Specimens: * No specimens in log *   Findings: As above

## 2018-02-26 NOTE — ANESTHESIA PREPROCEDURE EVALUATION
Procedure: Procedure(s):  OPEN REDUCTION INTERNAL FIXATION WRIST  Preop diagnosis: LEFT DISTAL RADIUS FRACTURE    Allergies   Allergen Reactions     Amiloride      Barbiturates Nausea and Vomiting     Meperidine Nausea and Vomiting     Past Medical History:   Diagnosis Date     ARMD (age related macular degeneration)      Arrhythmia     atrial fib     Atrial fibrillation (H)      Cancer (H)     colon     Cerebral infarction (H)      Chronic gastritis      CVA (cerebral infarction)      Dermatochalasis      Hyperlipidemia      Hypertension      Hypertrophic cardiomyopathy (H)      Kidney stones      Osteoporosis      Pseudophakia      PVD (posterior vitreous detachment)      Past Surgical History:   Procedure Laterality Date      SECTION       EYE SURGERY      cataract surgery     GI SURGERY      for cancer     LITHOTRIPSY       WRIST SURGERY       Prior to Admission medications    Medication Sig Start Date End Date Taking? Authorizing Provider   traMADol (ULTRAM) 50 MG tablet Take 1 tablet (50 mg) by mouth every 4 hours as needed for moderate pain No driving a car or drinking alcohol for 8 hours after taking this medication. 18   Jennie Vivas MD   acetaminophen (TYLENOL) 500 MG tablet Take 2 tablets (1,000 mg) by mouth 3 times daily 14   Malena Hobbs DO   metoprolol (LOPRESSOR) 50 MG tablet Take 3 tablets (150 mg) by mouth 2 times daily 14   Malena Hobbs DO   diltiazem (TIAZAC) 180 MG 24 hr ER beaded capsule Take 180 mg by mouth daily    Unknown, Entered By History   Lactobacillus (ACIDOPHILUS PO) Take 1 tablet by mouth 3 times daily    Unknown, Entered By History   AMOXICILLIN PO Take 1,000 mg by mouth 2 times daily X 14 days for H. Pylori    Unknown, Entered By History   CLARITHROMYCIN PO Take 500 mg by mouth 2 times daily X 14 days for H. Pylori    Unknown, Entered By History   OMEPRAZOLE PO Take 20 mg by mouth 2 times daily (before meals)    Unknown,  Entered By History   ASPIRIN PO Take 325 mg by mouth daily     Reported, Patient   Omega-3 Fatty Acids (OMEGA-3 FISH OIL PO) Take 1 g by mouth 3 times daily (with meals)     Reported, Patient   VITAMIN D, CHOLECALCIFEROL, PO Take 800 Units by mouth daily     Reported, Patient   Multiple Vitamins-Minerals (MULTIVITAMIN OR) Take 1 tablet by mouth daily     Reported, Patient     Current Facility-Administered Medications Ordered in Epic   Medication Dose Route Frequency Last Rate Last Dose     benzocaine (HURRICAINE/TOPEX) 20 % spray    PRN   3 spray at 10/27/14 1439     fentaNYL (SUBLIMAZE) injection    PRN   50 mcg at 10/27/14 1448     midazolam (VERSED) injection    PRN   0.5 mg at 10/27/14 1448     Current Outpatient Prescriptions Ordered in [facility]   Medication     traMADol (ULTRAM) 50 MG tablet     acetaminophen (TYLENOL) 500 MG tablet     metoprolol (LOPRESSOR) 50 MG tablet     diltiazem (TIAZAC) 180 MG 24 hr ER beaded capsule     Lactobacillus (ACIDOPHILUS PO)     AMOXICILLIN PO     CLARITHROMYCIN PO     OMEPRAZOLE PO     ASPIRIN PO     Omega-3 Fatty Acids (OMEGA-3 FISH OIL PO)     VITAMIN D, CHOLECALCIFEROL, PO     Multiple Vitamins-Minerals (MULTIVITAMIN OR)     Wt Readings from Last 1 Encounters:   02/11/18 45.4 kg (100 lb)     Temp Readings from Last 1 Encounters:   02/11/18 36.6  C (97.8  F) (Oral)     BP Readings from Last 6 Encounters:   02/11/18 (!) 137/100   12/21/14 145/80   10/27/14 146/80   10/27/14 (!) 119/93     Pulse Readings from Last 4 Encounters:   02/11/18 94   12/20/14 80     Resp Readings from Last 1 Encounters:   02/11/18 12     SpO2 Readings from Last 1 Encounters:   02/11/18 97%     Recent Labs   Lab Test  02/11/18   1305  12/19/14   0542   NA  144  140   POTASSIUM  3.6  3.4   CHLORIDE  111*  105   CO2  29  30   ANIONGAP  4  5   GLC  100*  137*   BUN  18  12   CR  0.67  0.57   MARKELL  8.7  8.7     Recent Labs   Lab Test  02/11/18   1305  12/19/14   0542   WBC  6.5  9.9   HGB  11.8  11.6*   PLT   162  245     ECG 2/2018: afib w/ RVR, no st or twave abnormalities.    ECHO 2017: CONCLUSION:    1. Normal LV size and hyperdynamic systolic function.    2. LVH with prominent basal septal hypertrophy. Turbulent flow     across LVOT. Prior study reported moderate LVOT obstruction.     Gradients not as well visualized on current study.     3. Mild aortic stenosis.    4. Mild mitral regurgitation.    5. Mild to moderate tricuspid regurgitation. Estimated RV systolic     pressure = 37 mmHg + right atrial pressure.    6. Mildly dilated ascending aorta.   Anesthesia Evaluation     .             ROS/MED HX    ENT/Pulmonary:      (-) tobacco use, asthma, COPD and sleep apnea   Neurologic:     (+)CVA    (-) seizures   Cardiovascular: Comment: Previous echo's outline hypertrophic cardiomyopathy, improved on most recent echo    (+) Dyslipidemia, hypertension----. : . . . :. .      (-) CAD, BERNARD, arrhythmias and valvular problems/murmurs   METS/Exercise Tolerance:     Hematologic:        (-) history of blood clots, anemia and other hematologic disorder   Musculoskeletal:  - neg musculoskeletal ROS (+) arthritis, , , -       GI/Hepatic:     (+) GERD      (-) liver disease   Renal/Genitourinary:     (+) Nephrolithiasis ,    (-) renal disease   Endo:      (-) Type I DM, Type II DM, thyroid disease and obesity   Psychiatric:  - neg psychiatric ROS       Infectious Disease:        (-) Recent Fever   Malignancy:         Other: Comment: Frequent falls                    Physical Exam  Normal systems: cardiovascular, pulmonary and dental    Airway   Mallampati: II  TM distance: >3 FB  Neck ROM: full    Dental     Cardiovascular   Rhythm and rate: regular and normal      Pulmonary    breath sounds clear to auscultation                    Anesthesia Plan      History & Physical Review      ASA Status:  3 .    NPO Status:  > 8 hours    Plan for Peripheral Nerve Block   PONV prophylaxis:  Ondansetron (or other 5HT-3)  Propofol infusion       Postoperative Care  Postoperative pain management:  Multi-modal analgesia.      Consents  Anesthetic plan, risks, benefits and alternatives discussed with:  Patient..                          .

## 2018-02-26 NOTE — DISCHARGE INSTRUCTIONS
Same Day Surgery Discharge Instructions for  Sedation and General Anesthesia       It's not unusual to feel dizzy, light-headed or faint for up to 24 hours after surgery or while taking pain medication.  If you have these symptoms: sit for a few minutes before standing and have someone assist you when you get up to walk or use the bathroom.      You should rest and relax for the next 24 hours. We recommend you make arrangements to have an adult stay with you for at least 24 hours after your discharge.  Avoid hazardous and strenuous activity.      DO NOT DRIVE any vehicle or operate mechanical equipment for 24 hours following the end of your surgery.  Even though you may feel normal, your reactions may be affected by the medication you have received.      Do not drink alcoholic beverages for 24 hours following surgery.       Slowly progress to your regular diet as you feel able. It's not unusual to feel nauseated and/or vomit after receiving anesthesia.  If you develop these symptoms, drink clear liquids (apple juice, ginger ale, broth, 7-up, etc. ) until you feel better.  If your nausea and vomiting persists for 24 hours, please notify your surgeon.        All narcotic pain medications, along with inactivity and anesthesia, can cause constipation. Drinking plenty of liquids and increasing fiber intake will help.      For any questions of a medical nature, call your surgeon.      Do not make important decisions for 24 hours.      If you had general anesthesia, you may have a sore throat for a couple of days related to the breathing tube used during surgery.  You may use Cepacol lozenges to help with this discomfort.  If it worsens or if you develop a fever, contact your surgeon.       If you feel your pain is not well managed with the pain medications prescribed by your surgeon, please contact your surgeon's office to let them know so they can address your concerns.       Post-Operative Instructions  [place]  [facility]  Roxanne Coon M.D.    PAIN    You may have numbing medication at your surgical site. As this medicine wears off, you can take the pain pills prescribed for you. Keep your arm elevated above your heart for the first 24 hours following surgery to reduce swelling and pain.    BANDAGE    Leave your bandage on and keep it clean and dry until you are seen in the clinic for a follow-up visit. If you take a shower, cover it with plastic wrap or a plastic bag.    SLING    If you receive a sling for your arm before you leave the hospital, you can wear it until your nerve block has lost its effect. After that, you should avoid using the sling, otherwise, you can develop neck and shoulder stiffness and discomfort.    ACTIVITIES    After surgery, begin moving your fingers gently. You may use your hand for light activities and driving while wearing the bandage. You may also return to work for light duty. Do not do any heavy lifting, pushing, or pulling with your hand.     FOLLOW-UP    You will need to come back for a checkup 10 to 14 days after your surgery. Call [phone] as soon as possible to make your appointment to see Bettina Coyne PA-C. You can be seen at [facility] ([facility] [address] [place] [address], 2nd floor, [place]) on Tuesday or Thursday. Bettina Coyne, my physician assistant, will examine your incision and take out your stitches. After that, you will need to wear the removable splint you will receive in the clinic. Your next appointment will be 4 weeks later with Dr. Coon.    Call Bettina Coyne at [phone] between 8:30 a.m. and 5:00 p.m. if you have:    A fever (101 F or higher)    Redness, swelling, or draining at the surgical site    Nausea, vomiting, or a rash from your medicine(s)    Any questions, problems, or concerns    For emergencies after 5:00 p.m., call the on-call physician at [phone].

## 2018-02-26 NOTE — OR NURSING
Patient stated having a little pain in her chest while getting dressed. Put patient back on monitor, patient stated it hurt worse when she coughs. Pt states it subsides with coughing. Patient resting in chair well.

## 2018-02-26 NOTE — OP NOTE
SURGEON: Roxanne Coon MD.  ASSISTANT: Bettina Coyne PA-C.    PREOPERATIVE DIAGNOSIS: Intraarticular malunion left distal radius fracture.    POSTOPERATIVE DIAGNOSIS: same    PROCEDURE: Corrective Osteotomy and Open reduction and internal fixation of the radius with an [place] plate ([id number]).    ANESTHESIA  Regional    TOURNIQUET TIME  Under Osteotomy    PROPHYLACTIC ANTIBIOTICS  Ancef given before the tourniquet was inflated.    INFORMED CONSENT  Obtained and signed prior to entrance to the operating room.    SURGICAL SITE SIGNED  The surgical site was signed by me prior to entering the OR.    TIME OUT  Performed prior to incision in the operating room.    DVT INDICATIONS  SCD's were not applied.    PROCEDURE:  The arm was prepped and draped in a normal standard manner.  A time-out was performed confirming the surgical site and that antibiotics had been given prior to the tourniquet inflation.  The arm was exsanguinated and the tourniquet was inflated 150 mm above the systolic pressure.    An incision was made directly over the FCR sheath and was carried down through the subcutaneous tissue.  The FCR sheath was then divided dorsally and volarly taking care not to cut the palmar cutaneous branch of the median nerve.  The FCR was retracted ulnarly along with the contents of the carpal canal.  The pronator quadratus was cut on the radial side and elevated ulnarly with the underlying fascia.  Weitlaner was used to retract the soft tissues.  The fracture had significant callus and I had to use an osteotome to pry pieces free.  The three articular fractures were reduced and two K-wires were placed through the radial styloid into the proximal radius to hold the fractures reduced.  Once this was done, the plate was chosen for the distal radius and 2 more 0.045 K-wires were used to hold the plate in place.  I drilled for the sliding screw hole first and a 2.7 screw was placed there.  I then drilled the distal  articular screws under mini C-arm fluoroscopy.  All of the distal screws were just shy of the dorsal cortex.  The remaining proximal cortical screws were drilled, measured, and placed.  Final x-rays were taken confirming the screw placement and reduction of the fracture.  The area was then irrigated out.  The pronator quadratus was repaired radially with 3-0 undyed Vicryl.  The FCR sheath was repaired volarly with 3-0 undyed Vicryl.  The tourniquet was then let down and all bleeders were cauterized.  The subcutaneous skin was closed with 3-0 undyed Vicryl and skin was closed with 4-0 Monocryl.  Marcaine with epinephrine was injected.  Splint was applied and the patient was transferred to the recovery room in stable condition.    Cc: my office

## 2018-02-26 NOTE — ANESTHESIA POSTPROCEDURE EVALUATION
Patient: Elana Renae    Procedure(s):  CORRECTIVE OSTEOTOMY AND OPEN REDUCTION INTERNAL FIXATION OF LEFT DISTAL RADIUS FRACTURE  - Wound Class: I-Clean    Diagnosis:LEFT DISTAL RADIUS FRACTURE  Diagnosis Additional Information: No value filed.    Anesthesia Type:  Peripheral Nerve Block    Note:  Anesthesia Post Evaluation    Patient location during evaluation: bedside  Patient participation: Able to participate in evaluation but full recovery from regional anesthesia has not yet ocurrred but is anticipated to occur within 48 hours  Level of consciousness: awake  Pain management: adequate  Airway patency: patent  Cardiovascular status: acceptable  Respiratory status: acceptable  Hydration status: acceptable  PONV: none     Anesthetic complications: None          Last vitals:  Vitals:    02/26/18 1501 02/26/18 1515 02/26/18 1530   BP:  125/86 114/74   Resp: 20 14 20   Temp: 36.8  C (98.2  F)     SpO2: 100% 94% 93%         Electronically Signed By: Arsen Lopez DO, DO  February 26, 2018  3:39 PM

## 2018-02-26 NOTE — IP AVS SNAPSHOT
MRN:[id number]                      After Visit Summary   2/26/2018    Elana Renae    MRN: [id number]           Thank you!     Thank you for choosing [facility] for your care. Our goal is always to provide you with excellent care. Hearing back from our patients is one way we can continue to improve our services. Please take a few minutes to complete the written survey that you may receive in the mail after you visit with us. Thank you!        Patient Information     Date Of Birth          1/12/1927        About your hospital stay     You were admitted on:  February 26, 2018 You last received care in the:  [facility] PreOP/Phase II    You were discharged on:  February 26, 2018       Who to Call     For medical emergencies, please call 911.  For non-urgent questions about your medical care, please call your primary care provider or clinic, [phone]  For questions related to your surgery, please call your surgery clinic        Attending Provider     Provider Lynne Wilkins MD Orthopedics       Primary Care Provider Office Phone # Fax #    Santosh Galvez [phone]      After Care Instructions     Discharge Instructions       Post-Operative Instructions  [facility]  Roxanne Coon M.D.    PAIN    You may have numbing medication at your surgical site. As this medicine wears off, you can take the pain pills prescribed for you. Keep your arm elevated above your heart for the first 24 hours following surgery to reduce swelling and pain.    BANDAGE    Leave your bandage on and keep it clean and dry until you are seen in the clinic for a follow-up visit. If you take a shower, cover it with plastic wrap or a plastic bag.    SLING    If you receive a sling for your arm before you leave the hospital, you can wear it until your nerve block has lost its effect. After that, you should avoid using the sling, otherwise, you can develop  neck and shoulder stiffness and discomfort.    ACTIVITIES    After surgery, begin moving your fingers gently. You may use your hand for light activities and driving while wearing the bandage. You may also return to work for light duty. Do not do any heavy lifting, pushing, or pulling with your hand.     FOLLOW-UP    You will need to come back for a checkup 10 to 14 days after your surgery. Call [phone] as soon as possible to make your appointment to see BEN Salcedo. You can be seen at [facility] ([facility] [address] [place] [address], 2nd floor, [place]) on Tuesday or Thursday. Bettina Coyne, my physician assistant, will examine your incision and take out your stitches. After that, you will need to wear the removable splint you will receive in the clinic. Your next appointment will be 4 weeks later with Dr. Coon.    Call Bettina Coyne at [phone] between 8:30 a.m. and 5:00 p.m. if you have:   A fever (101 F or higher)  Redness, swelling, or draining at the surgical site  Nausea, vomiting, or a rash from your medicine(s)  Any questions, problems, or concerns    For emergencies after 5:00 p.m., call the on-call physician at [phone].Review outpatient procedure discharge instructions with patient as directed by Provider                  Further instructions from your care team       Same Day Surgery Discharge Instructions for  Sedation and General Anesthesia       It's not unusual to feel dizzy, light-headed or faint for up to 24 hours after surgery or while taking pain medication.  If you have these symptoms: sit for a few minutes before standing and have someone assist you when you get up to walk or use the bathroom.      You should rest and relax for the next 24 hours. We recommend you make arrangements to have an adult stay with you for at least 24 hours after your discharge.  Avoid hazardous and strenuous activity.      DO NOT DRIVE any vehicle or operate mechanical equipment for 24 hours  following the end of your surgery.  Even though you may feel normal, your reactions may be affected by the medication you have received.      Do not drink alcoholic beverages for 24 hours following surgery.       Slowly progress to your regular diet as you feel able. It's not unusual to feel nauseated and/or vomit after receiving anesthesia.  If you develop these symptoms, drink clear liquids (apple juice, ginger ale, broth, 7-up, etc. ) until you feel better.  If your nausea and vomiting persists for 24 hours, please notify your surgeon.        All narcotic pain medications, along with inactivity and anesthesia, can cause constipation. Drinking plenty of liquids and increasing fiber intake will help.      For any questions of a medical nature, call your surgeon.      Do not make important decisions for 24 hours.      If you had general anesthesia, you may have a sore throat for a couple of days related to the breathing tube used during surgery.  You may use Cepacol lozenges to help with this discomfort.  If it worsens or if you develop a fever, contact your surgeon.       If you feel your pain is not well managed with the pain medications prescribed by your surgeon, please contact your surgeon's office to let them know so they can address your concerns.       Post-Operative Instructions  [facility]  Roxanne Coon M.D.    PAIN    You may have numbing medication at your surgical site. As this medicine wears off, you can take the pain pills prescribed for you. Keep your arm elevated above your heart for the first 24 hours following surgery to reduce swelling and pain.    BANDAGE    Leave your bandage on and keep it clean and dry until you are seen in the clinic for a follow-up visit. If you take a shower, cover it with plastic wrap or a plastic bag.    SLING    If you receive a sling for your arm before you leave the hospital, you can wear it until your nerve block has lost its effect. After that,  you should avoid using the sling, otherwise, you can develop neck and shoulder stiffness and discomfort.    ACTIVITIES    After surgery, begin moving your fingers gently. You may use your hand for light activities and driving while wearing the bandage. You may also return to work for light duty. Do not do any heavy lifting, pushing, or pulling with your hand.     FOLLOW-UP    You will need to come back for a checkup 10 to 14 days after your surgery. Call [phone] as soon as possible to make your appointment to see Bettina Coyne PA-C. You can be seen at [facility] ([address] [place] [address], 2nd floor, [place]) on Tuesday or Thursday. Bettina Coyne, my physician assistant, will examine your incision and take out your stitches. After that, you will need to wear the removable splint you will receive in the clinic. Your next appointment will be 4 weeks later with Dr. Coon.    Call Bettina Coyne at [phone] between 8:30 a.m. and 5:00 p.m. if you have:    A fever (101 F or higher)    Redness, swelling, or draining at the surgical site    Nausea, vomiting, or a rash from your medicine(s)    Any questions, problems, or concerns    For emergencies after 5:00 p.m., call the on-call physician at [phone].    Pending Results     Date and Time Order Name Status Description    2/26/2018 1507 XR Surgery ALEJANDRA Fluoro L/T 5 Min w Stills In process             Admission Information     Date & Time Provider Department Dept. Phone    2/26/2018 Lynne Coon MD [facility] PreOP/Phase [phone]      Your Vitals Were     Blood Pressure Temperature Respirations Height Weight Pulse Oximetry    120/76 98.2  F (36.8  C) (Temporal) 12 1.524 m (5') 47.6 kg (105 lb) 98%    BMI (Body Mass Index)                   20.51 kg/m2           MyChart Information     [facility] lets you send messages to your doctor, view your test results, renew your prescriptions, schedule appointments and more. To sign up,  "go to [email or web address] . Click on \"Log in\" on the left side of the screen, which will take you to the Welcome page. Then click on \"Sign up Now\" on the right side of the page.     You will be asked to enter the access code listed below, as well as some personal information. Please follow the directions to create your username and password.     Your access code is: [email or web address]  Expires: 2018  4:20 PM     Your access code will  in 90 days. If you need help or a new code, please call your [facility] clinic or [phone].        Care EveryWhere ID     This is your Care EveryWhere ID. This could be used by other organizations to access your [facility] medical records  [id number]        Equal Access to Services     AVIVA ROTH : Sushma Angeles, emperatriz arevalo, irvin masterson, vitaliy marroquin. So [facility] [phone].    ATENCIÓN: Si habla español, tiene a chopra disposición servicios gratuitos de asistencia lingüística. Jason al [phone].    We comply with applicable federal civil rights laws and [place] laws. We do not discriminate on the basis of race, color, national origin, age, disability, sex, sexual orientation, or gender identity.               Review of your medicines      START taking        Dose / Directions    acetaminophen-codeine 300-30 MG per tablet   Commonly known as:  TYLENOL #3   Used for:  Closed fracture of distal ends of left radius and ulna, initial encounter        Take 1-2 tablets every 4-6 hours for pain if needed.   Quantity:  30 tablet   Refills:  0         CONTINUE these medicines which have NOT CHANGED        Dose / Directions    acetaminophen 500 MG tablet   Commonly known as:  TYLENOL   Used for:  Compression fracture        Dose:  1000 mg   Take 2 tablets (1,000 mg) by mouth 3 times daily   Refills:  0       OMEGA-3 FISH OIL PO        Dose:  1 g   Take 1 g by mouth 3 times daily (with meals)   Refills:  0       traMADol " 50 MG tablet   Commonly known as:  ULTRAM        Dose:  50 mg   Take 1 tablet (50 mg) by mouth every 4 hours as needed for moderate pain No driving a car or drinking alcohol for 8 hours after taking this medication.   Quantity:  20 tablet   Refills:  0            Where to get your medicines      Some of these will need a paper prescription and others can be bought over the counter. Ask your nurse if you have questions.     Bring a paper prescription for each of these medications     acetaminophen-codeine 300-30 MG per tablet                Protect others around you: Learn how to safely use, store and throw away your medicines at www.disposemymeds.org.        Information about OPIOIDS     PRESCRIPTION OPIOIDS: WHAT YOU NEED TO KNOW    Prescription opioids can be used to help relieve moderate to severe pain and are often prescribed following a surgery or injury, or for certain health conditions. These medications can be an important part of treatment but also come with serious risks. It is important to work with your health care provider to make sure you are getting the safest, most effective care.    WHAT ARE THE RISKS AND SIDE EFFECTS OF OPIOID USE?  Prescription opioids carry serious risks of addiction and overdose, especially with prolonged use. An opioid overdose, often marked by slowed breathing can cause sudden death. The use of prescription opioids can have a number of side effects as well, even when taken as directed:      Tolerance - meaning you might need to take more of a medication for the same pain relief    Physical dependence - meaning you have symptoms of withdrawal when a medication is stopped    Increased sensitivity to pain    Constipation    Nausea, vomiting, and dry mouth    Sleepiness and dizziness    Confusion    Depression    Low levels of testosterone that can result in lower sex drive, energy, and strength    Itching and sweating    RISKS ARE GREATER WITH:    History of drug misuse,  substance use disorder, or overdose    Mental health conditions (such as depression or anxiety)    Sleep apnea    Older age (65 years or older)    Pregnancy    Avoid alcohol while taking prescription opioids.   Also, unless specifically advised by your health care provider, medications to avoid include:    Benzodiazepines (such as Xanax or Valium)    Muscle relaxants (such as Soma or Flexeril)    Hypnotics (such as Ambien or Lunesta)    Other prescription opioids    KNOW YOUR OPTIONS:  Talk to your health care provider about ways to manage your pain that do not involve prescription opioids. Some of these options may actually work better and have fewer risks and side effects:    Pain relievers such as acetaminophen, ibuprofen, and naproxen    Some medications that are also used for depression or seizures    Physical therapy and exercise    Cognitive behavioral therapy, a psychological, goal-directed approach, in which patients learn how to modify physical, behavioral, and emotional triggers of pain and stress    IF YOU ARE PRESCRIBED OPIOIDS FOR PAIN:    Never take opioids in greater amounts or more often than prescribed    Follow up with your primary health care provider and work together to create a plan on how to manage your pain.    Talk about ways to help manage your pain that do not involve prescription opioids    Talk about all concerns and side effects    Help prevent misuse and abuse    Never sell or share prescription opioids    Never use another person's prescription opioids    Store prescription opioids in a secure place and out of reach of others (this may include visitors, children, friends, and family)    Visit www.cdc.gov/drugoverdose to learn about risks of opioid abuse and overdose    If you believe you may be struggling with addiction, tell your health care provider and ask for guidance or call [facility]'s National Helpline at 9-590-383-HELP    LEARN MORE /  www.cdc.gov/drugoverdose/prescribing/guideline.html    Safely dispose of unused prescription opioids: Find your local drug take-back programs and more information about the importance of safe disposal at www.doseofreality.[place].gov             Medication List: This is a list of all your medications and when to take them. Check marks below indicate your daily home schedule. Keep this list as a reference.      Medications           Morning Afternoon Evening Bedtime As Needed    acetaminophen 500 MG tablet   Commonly known as:  TYLENOL   Take 2 tablets (1,000 mg) by mouth 3 times daily                                acetaminophen-codeine 300-30 MG per tablet   Commonly known as:  TYLENOL #3   Take 1-2 tablets every 4-6 hours for pain if needed.                                OMEGA-3 FISH OIL PO   Take 1 g by mouth 3 times daily (with meals)                                traMADol 50 MG tablet   Commonly known as:  ULTRAM   Take 1 tablet (50 mg) by mouth every 4 hours as needed for moderate pain No driving a car or drinking alcohol for 8 hours after taking this medication.

## 2018-02-26 NOTE — OR NURSING
Dr. Parsons came by to see patient regarding episode relayed by Laura MUNOZ, had a tight chest relieved with cough.  Ok to discharge patient.  Denies any issues.  VSS throughout recovery.

## 2018-02-26 NOTE — OR NURSING
Pt arrived alert, calm, monitor reading atrial fib.  VSS. Pt oriented to place and situation. Pleasant.

## 2018-02-26 NOTE — IP AVS SNAPSHOT
[facility] PreOP/Phase II    [address]    [place] [address]    Phone:  [phone]                                       After Visit Summary   2/26/2018    Elana Renae    MRN: [id number]           After Visit Summary Signature Page     I have received my discharge instructions, and my questions have been answered. I have discussed any challenges I see with this plan with the nurse or doctor.    ..........................................................................................................................................  Patient/Patient Representative Signature      ..........................................................................................................................................  Patient Representative Print Name and Relationship to Patient    ..................................................               ................................................  Date                                            Time    ..........................................................................................................................................  Reviewed by Signature/Title    ...................................................              ..............................................  Date                                                            Time

## (undated) DEVICE — SU MONOCRYL 4-0 PC-3 18" UND Y845G

## (undated) DEVICE — DRSG KERLIX 4 1/2"X4YDS ROLL 6715

## (undated) DEVICE — GLOVE PROTEXIS MICRO 6.5  2D73PM65

## (undated) DEVICE — BNDG ELASTIC 4"X5YDS UNSTERILE 6611-40

## (undated) DEVICE — SOL NACL 0.9% IRRIG 1000ML BOTTLE 07138-09

## (undated) DEVICE — CAST PADDING 4" STERILE 9044S

## (undated) DEVICE — CAST PLASTER SPLINT 4X15" 7394

## (undated) DEVICE — PACK HAND WRIST SOP15HWFSP

## (undated) DEVICE — SOL ADH LIQUID BENZOIN SWAB 0.6ML C1544

## (undated) DEVICE — DRAPE MINI C-ARM 4003

## (undated) DEVICE — DRSG STERI STRIP 1/2X4" R1547

## (undated) DEVICE — SU VICRYL 3-0 RB-1 27" UND J215H

## (undated) DEVICE — GLOVE PROTEXIS POWDER FREE 7.5 ORTHOPEDIC 2D73ET75

## (undated) DEVICE — LINEN TOWEL PACK X5 5464

## (undated) DEVICE — GLOVE PROTEXIS BLUE W/NEU-THERA 6.5  2D73EB65

## (undated) RX ORDER — PROPOFOL 10 MG/ML
INJECTION, EMULSION INTRAVENOUS
Status: DISPENSED
Start: 2018-02-26

## (undated) RX ORDER — BUPIVACAINE HYDROCHLORIDE AND EPINEPHRINE 5; 5 MG/ML; UG/ML
INJECTION, SOLUTION EPIDURAL; INTRACAUDAL; PERINEURAL
Status: DISPENSED
Start: 2018-02-26

## (undated) RX ORDER — ONDANSETRON 2 MG/ML
INJECTION INTRAMUSCULAR; INTRAVENOUS
Status: DISPENSED
Start: 2018-02-26

## (undated) RX ORDER — DEXAMETHASONE SODIUM PHOSPHATE 4 MG/ML
INJECTION, SOLUTION INTRA-ARTICULAR; INTRALESIONAL; INTRAMUSCULAR; INTRAVENOUS; SOFT TISSUE
Status: DISPENSED
Start: 2018-02-26

## (undated) RX ORDER — FENTANYL CITRATE 50 UG/ML
INJECTION, SOLUTION INTRAMUSCULAR; INTRAVENOUS
Status: DISPENSED
Start: 2018-02-26

## (undated) RX ORDER — CEFAZOLIN SODIUM 2 G/100ML
INJECTION, SOLUTION INTRAVENOUS
Status: DISPENSED
Start: 2018-02-26

## (undated) RX ORDER — LIDOCAINE HYDROCHLORIDE 20 MG/ML
INJECTION, SOLUTION EPIDURAL; INFILTRATION; INTRACAUDAL; PERINEURAL
Status: DISPENSED
Start: 2018-02-26